# Patient Record
Sex: FEMALE | Race: ASIAN | NOT HISPANIC OR LATINO | URBAN - METROPOLITAN AREA
[De-identification: names, ages, dates, MRNs, and addresses within clinical notes are randomized per-mention and may not be internally consistent; named-entity substitution may affect disease eponyms.]

---

## 2022-02-26 ENCOUNTER — EMERGENCY (EMERGENCY)
Facility: HOSPITAL | Age: 27
LOS: 1 days | Discharge: HOME | End: 2022-02-26
Attending: EMERGENCY MEDICINE | Admitting: EMERGENCY MEDICINE
Payer: COMMERCIAL

## 2022-02-26 VITALS
RESPIRATION RATE: 16 BRPM | SYSTOLIC BLOOD PRESSURE: 101 MMHG | OXYGEN SATURATION: 100 % | HEART RATE: 75 BPM | TEMPERATURE: 98 F | DIASTOLIC BLOOD PRESSURE: 74 MMHG

## 2022-02-26 VITALS
TEMPERATURE: 98 F | OXYGEN SATURATION: 100 % | SYSTOLIC BLOOD PRESSURE: 143 MMHG | RESPIRATION RATE: 16 BRPM | DIASTOLIC BLOOD PRESSURE: 68 MMHG | HEART RATE: 100 BPM

## 2022-02-26 LAB
ALBUMIN SERPL ELPH-MCNC: 5 G/DL — SIGNIFICANT CHANGE UP (ref 3.3–5)
ALP SERPL-CCNC: 75 U/L — SIGNIFICANT CHANGE UP (ref 40–120)
ALT FLD-CCNC: 28 U/L — SIGNIFICANT CHANGE UP (ref 4–33)
ANION GAP SERPL CALC-SCNC: 13 MMOL/L — SIGNIFICANT CHANGE UP (ref 7–14)
APPEARANCE UR: CLEAR — SIGNIFICANT CHANGE UP
AST SERPL-CCNC: 22 U/L — SIGNIFICANT CHANGE UP (ref 4–32)
BASOPHILS # BLD AUTO: 0.09 K/UL — SIGNIFICANT CHANGE UP (ref 0–0.2)
BASOPHILS NFR BLD AUTO: 0.8 % — SIGNIFICANT CHANGE UP (ref 0–2)
BILIRUB SERPL-MCNC: 1 MG/DL — SIGNIFICANT CHANGE UP (ref 0.2–1.2)
BILIRUB UR-MCNC: NEGATIVE — SIGNIFICANT CHANGE UP
BLD GP AB SCN SERPL QL: NEGATIVE — SIGNIFICANT CHANGE UP
BUN SERPL-MCNC: 10 MG/DL — SIGNIFICANT CHANGE UP (ref 7–23)
CALCIUM SERPL-MCNC: 9.6 MG/DL — SIGNIFICANT CHANGE UP (ref 8.4–10.5)
CHLORIDE SERPL-SCNC: 102 MMOL/L — SIGNIFICANT CHANGE UP (ref 98–107)
CO2 SERPL-SCNC: 23 MMOL/L — SIGNIFICANT CHANGE UP (ref 22–31)
COLOR SPEC: SIGNIFICANT CHANGE UP
CREAT SERPL-MCNC: 0.74 MG/DL — SIGNIFICANT CHANGE UP (ref 0.5–1.3)
DIFF PNL FLD: ABNORMAL
EOSINOPHIL # BLD AUTO: 0.24 K/UL — SIGNIFICANT CHANGE UP (ref 0–0.5)
EOSINOPHIL NFR BLD AUTO: 2.1 % — SIGNIFICANT CHANGE UP (ref 0–6)
GLUCOSE SERPL-MCNC: 104 MG/DL — HIGH (ref 70–99)
GLUCOSE UR QL: NEGATIVE — SIGNIFICANT CHANGE UP
HCT VFR BLD CALC: 36.7 % — SIGNIFICANT CHANGE UP (ref 34.5–45)
HGB BLD-MCNC: 11.9 G/DL — SIGNIFICANT CHANGE UP (ref 11.5–15.5)
IANC: 6.57 K/UL — SIGNIFICANT CHANGE UP (ref 1.5–8.5)
IMM GRANULOCYTES NFR BLD AUTO: 0.3 % — SIGNIFICANT CHANGE UP (ref 0–1.5)
KETONES UR-MCNC: NEGATIVE — SIGNIFICANT CHANGE UP
LEUKOCYTE ESTERASE UR-ACNC: NEGATIVE — SIGNIFICANT CHANGE UP
LYMPHOCYTES # BLD AUTO: 3.64 K/UL — HIGH (ref 1–3.3)
LYMPHOCYTES # BLD AUTO: 32.1 % — SIGNIFICANT CHANGE UP (ref 13–44)
MCHC RBC-ENTMCNC: 27.9 PG — SIGNIFICANT CHANGE UP (ref 27–34)
MCHC RBC-ENTMCNC: 32.4 GM/DL — SIGNIFICANT CHANGE UP (ref 32–36)
MCV RBC AUTO: 86.2 FL — SIGNIFICANT CHANGE UP (ref 80–100)
MONOCYTES # BLD AUTO: 0.76 K/UL — SIGNIFICANT CHANGE UP (ref 0–0.9)
MONOCYTES NFR BLD AUTO: 6.7 % — SIGNIFICANT CHANGE UP (ref 2–14)
NEUTROPHILS # BLD AUTO: 6.57 K/UL — SIGNIFICANT CHANGE UP (ref 1.8–7.4)
NEUTROPHILS NFR BLD AUTO: 58 % — SIGNIFICANT CHANGE UP (ref 43–77)
NITRITE UR-MCNC: NEGATIVE — SIGNIFICANT CHANGE UP
NRBC # BLD: 0 /100 WBCS — SIGNIFICANT CHANGE UP
NRBC # FLD: 0 K/UL — SIGNIFICANT CHANGE UP
PH UR: 5.5 — SIGNIFICANT CHANGE UP (ref 5–8)
PLATELET # BLD AUTO: 381 K/UL — SIGNIFICANT CHANGE UP (ref 150–400)
POTASSIUM SERPL-MCNC: 4.1 MMOL/L — SIGNIFICANT CHANGE UP (ref 3.5–5.3)
POTASSIUM SERPL-SCNC: 4.1 MMOL/L — SIGNIFICANT CHANGE UP (ref 3.5–5.3)
PROT SERPL-MCNC: 8.3 G/DL — SIGNIFICANT CHANGE UP (ref 6–8.3)
PROT UR-MCNC: NEGATIVE — SIGNIFICANT CHANGE UP
RBC # BLD: 4.26 M/UL — SIGNIFICANT CHANGE UP (ref 3.8–5.2)
RBC # FLD: 13 % — SIGNIFICANT CHANGE UP (ref 10.3–14.5)
RH IG SCN BLD-IMP: POSITIVE — SIGNIFICANT CHANGE UP
SODIUM SERPL-SCNC: 138 MMOL/L — SIGNIFICANT CHANGE UP (ref 135–145)
SP GR SPEC: 1.02 — SIGNIFICANT CHANGE UP (ref 1–1.05)
UROBILINOGEN FLD QL: SIGNIFICANT CHANGE UP
WBC # BLD: 11.33 K/UL — HIGH (ref 3.8–10.5)
WBC # FLD AUTO: 11.33 K/UL — HIGH (ref 3.8–10.5)

## 2022-02-26 PROCEDURE — 76830 TRANSVAGINAL US NON-OB: CPT | Mod: 26

## 2022-02-26 PROCEDURE — 99285 EMERGENCY DEPT VISIT HI MDM: CPT

## 2022-02-26 NOTE — ED ADULT NURSE REASSESSMENT NOTE - GENERAL PATIENT STATE
Aunt with pt/comfortable appearance/improvement verbalized/family/SO at bedside/smiling/interactive
Pt st"I have just alittle pain in left side and bleeding is very light now. "/comfortable appearance/improvement verbalized/family/SO at bedside/smiling/interactive

## 2022-02-26 NOTE — ED ADULT TRIAGE NOTE - TEMPERATURE IN CELSIUS (DEGREES C)
36.4 Admit to Medicine   UA +, WBC 29.4, afebrile -   Vitals q 4 hrs,   Trend fever curve, WBC,  Urine culture ordered  Blood cultures x 2 sets testing   COVID  PCR Negative   Pt started on Zosyn IV & Vanco IV UA +, WBC 29.4, afebrile -   Vitals q 4 hrs,   Trend fever curve, WBC,  Urine culture ordered  Blood cultures x 2 sets testing   COVID  PCR Negative   Pt started on Zosyn IV & Vanco IV Leukocytosis 29K, Tachycardia, HR in 90s; UA c/w UTI; Chronic urinary catheter;   -PICC line existing, of unclear indication  -CXR: abnormal with possible PNA  -Azithromycin IV Leukocytosis 29K, Tachycardia, HR in 90s; UA c/w UTI; Chronic urinary catheter;   -PICC line, existing, of unclear indication - see plan below   -CXR: abnormal with possible PNA  -empiric Azithromycin IV x 1 dose pending Legionella Ag   -Zosyn IV  -Covid-19 negative

## 2022-02-26 NOTE — ED PROVIDER NOTE - PROGRESS NOTE DETAILS
SIS Rutledge: OB res, Dr. Gant in ED to evaluate patient. SIS Rutledge: OB resident recommends outpatient f/u.

## 2022-02-26 NOTE — ED PROVIDER NOTE - ATTENDING CONTRIBUTION TO CARE
I performed a face-to-face evaluation of the patient and performed a history and physical examination. I agree with the history and physical examination. If this was a PA visit, I personally saw the patient with the PA and performed a substantive portion of the visit including all aspects of the medical decision making.

## 2022-02-26 NOTE — ED PROVIDER NOTE - CLINICAL SUMMARY MEDICAL DECISION MAKING FREE TEXT BOX
27 y/o F w/ no pertinent PMHx, PSHx c/o vaginal bleeding x 2 week - found to have cyst on US done in office of OBGYN. Plan to r/o torsion or other gynecological emergencies. Dispo pending. 27 y/o F w/ no pertinent PMHx, PSHx c/o vaginal bleeding x 2 week - found to have cyst on US done in office of OBGYN. Plan to r/o torsion or other gynecological emergencies. Will d/c home to f/u as an outpatient.

## 2022-02-26 NOTE — ED PROVIDER NOTE - PHYSICAL EXAMINATION
CONSTITUTIONAL: Well-appearing; well-nourished; in no apparent distress. Non-toxic appearing.   NEURO: Alert & oriented. Gait steady without assistance. Sensory and motor functions are grossly intact.  PSYCH: Mood appropriate. Thought processes intact.   EYES: Conjunctiva are pale.   NECK: Supple  CARD: Regular rate and rhythm, no murmurs  RESP: No accessory muscle use; breath sounds clear and equal bilaterally; no wheezes, rhonchi, or rales     ABD: Soft; non-distended; non-tender. No guarding or rebound,  : No CVA tenderness b/l. Aunt present at bedside for bimanual exam. No CMT or adnexal tenderness b/l. Speculum exam deferred.   MUSCULOSKELETAL/EXTREMITIES: FROM in all four extremities; no extremity edema.  SKIN: Warm; dry; no apparent lesions or exudate CONSTITUTIONAL: Well-appearing; well-nourished; in no apparent distress. Non-toxic appearing.   NEURO: Alert & oriented. Gait steady without assistance. Sensory and motor functions are grossly intact.  PSYCH: Mood appropriate. Thought processes intact.   EYES: Conjunctiva are pale.   NECK: Supple  CARD: Regular rate and rhythm, no murmurs  RESP: No accessory muscle use; breath sounds clear and equal bilaterally; no wheezes, rhonchi, or rales     ABD: Soft; non-distended; non-tender. No guarding or rebound,  : No CVA tenderness b/l. Aunt present at bedside for bimanual exam. No CMT or adnexal tenderness b/l. Speculum exam deferred.   MUSCULOSKELETAL/EXTREMITIES: FROM in all four extremities; no extremity edema.  SKIN: Warm; dry; no apparent lesions or exudate  ATTENDING PHYSICAL EXAM  GEN - NAD; well appearing; A+O x3  HEAD - NC/AT; anicteric  NECK: Neck supple, non-tender without lymphadenopathy, no masses, no JVD  PULMONARY - CTA b/l, symmetric breath sounds, no w/r/r  CARDIAC -s1s2, RRR, no M,R,G  ABDOMEN - +NABS, ND, + LLQ TTP, soft, no guarding, no rebound  BACK - no CVA tenderness, No vertebral or paravertebral tenderness  EXTREMITIES - symmetric pulses, 2+ dp, capillary refill < 2 seconds, no clubbing, no cyanosis, no edema

## 2022-02-26 NOTE — ED PROVIDER NOTE - NSFOLLOWUPINSTRUCTIONS_ED_ALL_ED_FT
There are no signs of emergency conditions requiring admission to the hospital on today's workup.  Based on the evaluation, a presumptive diagnosis was made, however, further evaluation may be required by your primary care physician or a specialist for a more definitive diagnosis.  Therefore, please follow-up as directed or return to the Emergency Department if your symptoms change or worsen.    We recommend that you:  See your primary care physician within the next 72 hours for follow up.  Bring a copy of your discharge paperwork (including any test results) to your doctor.        *** Return immediately if you have worsening symptoms, chest pain, Shortness of Breath, abdominal pain, Nausea/Vomiting/Diarrhea, dizziness, weakness, confusion, severe headache, vision changes, urinary symptoms, syncope, falls, trauma, discharge, bleeding, fevers, or any other new/concerning symptoms. ***

## 2022-02-26 NOTE — ED PROVIDER NOTE - OBJECTIVE STATEMENT
25 y/o F w/ no pertinent PMHx, PSHx c/o vaginal bleeding that has worsened over the past 2 weeks. Pt states that she had initially used 3 super+ tampons a day but currently the bleeding has improved so she uses 2 a day now. Pt reports associated left lower quadrant pain which radiates to the hip and is worsened with walking. Pt admits that she was seen by her Ob/Gyn to obtain an ultrasound and was told to go to the ED urgently due to the presence of an ovarian cyst. Pt denies fever, chills, nausea, vomiting, diarrhea, dysuria, urinary frequency, or any other complaints. 25 y/o F w/ no pertinent PMHx, PSHx c/o vaginal bleeding x 2 weeks. Pt states that she had initially used 3 super+ tampons a day but currently the bleeding has improved so she uses 2 a day now. Pt reports associated left lower quadrant pain which radiates to the hip and is worsened with walking. Pt admits that she was seen by her Ob/Gyn today and obtained an ultrasound and was told to go to the ED urgently due to the presence of an ovarian cyst. Pt denies fever, chills, nausea, vomiting, diarrhea, dysuria, urinary frequency, or any other complaints. 27 y/o F w/ no pertinent PMHx, PSHx c/o vaginal bleeding x 2 weeks. Pt states that she had initially used 3 super+ tampons a day but currently the bleeding has improved so she uses 2 a day now. Pt reports associated left lower quadrant pain which radiates to the hip and is worsened with walking. Pt admits that she was seen by her Ob/Gyn today and obtained an ultrasound and was told to go to the ED urgently due to the presence of an ovarian cyst. Pt denies fever, chills, nausea, vomiting, diarrhea, dysuria, urinary frequency, or any other complaints.  Attending - Agree with above.  I evaluated patient myself. 27 y/o F with Aunt at bedside, who is SYEDA RN.  Patient having on/off left lower abd pain x few months, constantly present x 2 weeks with vaginal bleeding x 2 weeks.  To OBGYN Dr. Olive Wu today who did ultrasound and per patient, concerned about torsion and problem with uterus, so told to go to [facility] ED now for surgery today.  Patient states she called Aunt and came to [facility] for 'second opinion'.  No fever, cough, recent covid.

## 2022-02-26 NOTE — CONSULT NOTE ADULT - ASSESSMENT
25 y/o G0 LMP 2/14 presenting from GYN office with acute GYN pathology (?ovarian cyst). Patient is cliniaclly and hemodymically stable. TVUS wnl. No acute GYN pathology seen.   - No acute GYN interventions at this time  - Return precautions given  - Recommend bowel regimen  - Referral to [facility]    D/w Dr. Sofía Gant, PGY-2 27 y/o G0 LMP 2/14 presenting from GYN office with acute GYN pathology (?ovarian cyst). Patient is cliniaclly and hemodymically stable. TVUS wnl. No acute GYN pathology seen.   - No acute GYN interventions at this time  - Return precautions given  - Recommend bowel regimen  - Referral to [facility]    D/w Dr. Sofía Gant, PGY-2    27 y/o G0 LMP 2/14 referred from private GYN office with concern for acute Gyn pathology; no acute Gyn pathology identified - to continue outpatient follow-up.  Yoni Gore M.D.

## 2022-02-26 NOTE — ED ADULT TRIAGE NOTE - CHIEF COMPLAINT QUOTE
pt c/o  heavy vag bleeding with pain on left , seen by pmd gyn  told to come in pt has multiply cyst seen on ultra sound/ pt has not  period for 3 months but not sexually active. pt c/o left side abd pain 10/10 with vag bleed

## 2022-02-26 NOTE — ED ADULT NURSE NOTE - OBJECTIVE STATEMENT
Pt received to z3 from ultrasound. Pt calm smiling affect, with Aunt at bedside. Pt st" I am frighten because I was sent here by MD told I have cyst and need surgery. I have irregular periods and have been bleeding intially heavy for 2 weeks but now bleeding is light, I also have left lower abd discomfort." sl and labs sent by intake rn. ulltrasound results pending . Pt declining any pain management meds. will cont to assess.

## 2022-02-26 NOTE — CONSULT NOTE ADULT - SUBJECTIVE AND OBJECTIVE BOX
YARED ARTEAGA  26y  Female [id number]    HPI:  25 y/o G0 LMP  presenting from GYN office with acute GYN pathology on office sono. Patient states that she had her regular scheduled visit with outpatient GYN. She told the outpatient GYN that she had intermittent LLQ discomfort that radiates to the back for which she has not taken any pills. The GYN did a sono that revealed acute pathology, presumable suspected ovarian torsion and was referred to the ED. Patient presented to [facility] for second opinion. Patient reports history of irregular menses undergoing workup for PCOS with her outpatient provider. She also reports longstanding history of dull LLQ/L flank discomfort that radiates to the lower back. Denies any F/C, nausea, vomiting, lightheadedness, dizziness, CP, SOB, urinary complaints. Reports history of constipation.       Name of GYN Physician: Dr. Wu @[facility]    POB:  G0  Pgyn: ?ovarian cyst. Denies fibroids, endometriosis, STI's, Abnormal pap smears   PMH: denies  PSH: denies  Meds: none  All: NKDA  SH: Denies smoking use, drug use, alcohol use.   +occasional social alcohol use  Home meds:     Hospital Meds:   MEDICATIONS  (STANDING):    MEDICATIONS  (PRN):      Allergies      Intolerances        PAST MEDICAL & SURGICAL HISTORY:  No pertinent past medical history    No significant past surgical history        FAMILY HISTORY:        Vital Signs Last 24 Hrs  T(C): 36.8 (2022 21:28), Max: 36.8 (2022 21:28)  T(F): 98.3 (2022 21:28), Max: 98.3 (2022 21:28)  HR: 76 (2022 21:28) (76 - 100)  BP: 117/63 (2022 21:28) (117/63 - 143/68)  BP(mean): --  RR: 17 (2022 21:28) (16 - 17)  SpO2: 100% (2022 21:28) (100% - 100%)    Physical Exam:   General: sitting comfortably in bed, NAD   CV: RRR  Lungs: CTA b/l  Back: No CVA tenderness  Abd: Soft, non-tender, non-distended.    :  No bleeding on pad.    External labia wnl.  Bimanual exam with no cervical motion tenderness, uterus wnl, adnexa non palpable b/l.  Cervix closed   Speculum Exam: No active bleeding from os. Scant blood in vaginal vault.     Ext: non-tender b/l    LABS:                        11.9   11.33 )-----------( 381      ( 2022 20:22 )             36.7         138  |  102  |  10  ----------------------------<  104<H>  4.1   |  23  |  0.74    Ca    9.6      2022 20:22    TPro  8.3  /  Alb  5.0  /  TBili  1.0  /  DBili  x   /  AST  22  /  ALT  28  /  AlkPhos  75      I&O's Detail      Urinalysis Basic - ( 2022 20:22 )    Color: Light Yellow / Appearance: Clear / S.019 / pH: x  Gluc: x / Ketone: Negative  / Bili: Negative / Urobili: <2 mg/dL   Blood: x / Protein: Negative / Nitrite: Negative   Leuk Esterase: Negative / RBC: x / WBC x   Sq Epi: x / Non Sq Epi: x / Bacteria: x        RADIOLOGY & ADDITIONAL STUDIES:  < from: US Transvaginal (22 @ 20:45) >  FINDINGS:    Uterus: 6.4 x 3.6 x 5.1 cm. Within normal limits.  Endometrium: 0.9 cm. Within normal limits.    Right ovary: 3.2 x 2.5 x 2.1 cm. Within normal limits. Normal arterial   and venous waveforms.  Left ovary: 2.6 x 1.9 x 2.7 cm. Within normal limits. Normal arterial and   venous waveforms.    Fluid: None.    IMPRESSION:  Normal pelvic sonogram.    --- End of Report ---    < end of copied text >

## 2022-02-26 NOTE — ED PROVIDER NOTE - PATIENT PORTAL LINK FT
You can access the FollowMyHealth Patient Portal offered by [facility] by registering at the following website: [email or web address]. By joining [facility]’s FollowMyHealth portal, you will also be able to view your health information using other applications (apps) compatible with our system.